# Patient Record
Sex: FEMALE | Race: WHITE
[De-identification: names, ages, dates, MRNs, and addresses within clinical notes are randomized per-mention and may not be internally consistent; named-entity substitution may affect disease eponyms.]

---

## 2017-08-19 ENCOUNTER — HOSPITAL ENCOUNTER (EMERGENCY)
Dept: HOSPITAL 75 - ER | Age: 82
Discharge: HOME | End: 2017-08-19
Payer: MEDICARE

## 2017-08-19 VITALS — DIASTOLIC BLOOD PRESSURE: 78 MMHG | SYSTOLIC BLOOD PRESSURE: 142 MMHG

## 2017-08-19 VITALS — BODY MASS INDEX: 25.1 KG/M2 | HEIGHT: 64 IN | WEIGHT: 147 LBS

## 2017-08-19 DIAGNOSIS — S01.81XA: ICD-10-CM
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
## 2017-08-19 DIAGNOSIS — I10: ICD-10-CM

## 2017-08-19 DIAGNOSIS — S52.572A: Primary | ICD-10-CM

## 2017-08-19 DIAGNOSIS — Z90.79: ICD-10-CM

## 2017-08-19 DIAGNOSIS — E78.00: ICD-10-CM

## 2017-08-19 DIAGNOSIS — K21.9: ICD-10-CM

## 2017-08-19 DIAGNOSIS — Z90.49: ICD-10-CM

## 2017-08-19 DIAGNOSIS — Z90.710: ICD-10-CM
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
## 2017-08-19 PROCEDURE — 90471 IMMUNIZATION ADMIN: CPT

## 2017-08-19 PROCEDURE — 64450 NJX AA&/STRD OTHER PN/BRANCH: CPT

## 2017-08-19 PROCEDURE — 90715 TDAP VACCINE 7 YRS/> IM: CPT

## 2017-08-19 PROCEDURE — 70450 CT HEAD/BRAIN W/O DYE: CPT

## 2017-08-19 PROCEDURE — 73090 X-RAY EXAM OF FOREARM: CPT

## 2017-08-19 PROCEDURE — 72125 CT NECK SPINE W/O DYE: CPT

## 2017-08-19 PROCEDURE — 25605 CLTX DST RDL FX/EPHYS SEP W/: CPT

## 2017-08-19 PROCEDURE — 96375 TX/PRO/DX INJ NEW DRUG ADDON: CPT

## 2017-08-19 PROCEDURE — 96374 THER/PROPH/DIAG INJ IV PUSH: CPT

## 2017-08-19 PROCEDURE — 29125 APPL SHORT ARM SPLINT STATIC: CPT

## 2017-08-19 PROCEDURE — 73110 X-RAY EXAM OF WRIST: CPT

## 2017-08-19 PROCEDURE — 12011 RPR F/E/E/N/L/M 2.5 CM/<: CPT

## 2017-08-19 NOTE — DIAGNOSTIC IMAGING REPORT
INDICATION:  Fall, deformity, fracture. History previous wrist

fracture 20 years prior.



TECHNIQUE:  3 views of the left wrist  



CORRELATION STUDY:  None



FINDINGS: There is a comminuted but predominantly transverse

fracture of the distal radius. There is significant volar

displacement of the majority of the fracture fragments. Fracture

lines do extend into the articular surface. The carpal bones

follow displaced distal radial fracture fragments. Nondisplaced

ulnar styloid process fracture is noted. Age of this is somewhat

indeterminate may be chronic. However there does appear to be

additional nondisplaced fracture of the distal ulna as well. 

Diffuse bony demineralization. Rather advanced degenerative

change of the first carpometacarpal articulation.



IMPRESSION:  

1.  Comminuted impacted displaced interarticular distal left

radius fracture. Indeterminate ulnar styloid process fracture

with an additional nondisplaced fracture of the distal ulna

shaft.     



Dictated by: 



  Dictated on workstation # [id number]

## 2017-08-19 NOTE — DIAGNOSTIC IMAGING REPORT
INDICATION: Postreduction.



Exam compared with study earlier this same date.



FINDINGS: Alignment and impaction of the distal radial

metadiaphyseal junction fracture significantly improved. Residual

anterior angulation much less pronounced. Anterior displacement

of major distal fragment no longer apparent. Avulsion at the base

of the ulnar styloid unchanged this may be chronic.



IMPRESSION: Much improved alignment with decreased impaction,

angulation and displacement of distal radial fracture.



Dictated by: 



  Dictated on workstation # [id number]

## 2017-08-19 NOTE — DIAGNOSTIC IMAGING REPORT
PROCEDURE: CT head and CT cervical spine without contrast.



TECHNIQUE: Multiple contiguous axial images were obtained through

the brain and cervical spine without the use of intravenous

contrast. Sagittal and coronal reformations through the cervical

spine were then performed.



INDICATION:  Fall with lacerations to the left head anteriorly,

pain.



Head CT compared to 10/18/2016.



There is cerebral cortical atrophy and extensive periventricular

white matter disease all unchanged from prior. No focal edema. No

sulcal effacement. There are no abnormal extra-axial fluid

collections and there is no evidence for intracranial involvement

by hemorrhage. Atrophy is unchanged without alok hydrocephalus.

Orbits, sinuses and calvarium appeared nonacute. Tiny scalp 

irregularity supraorbital left frontal region noted. No retained

opaque foreign body.



CT cervical spine: The vertebral body heights are within normal

limits. There is no acute or suspicious endplate irregularity.

There is grade 1 listhesis anteriorly C3 on C4 of 4 mm. Remaining

levels are aligned normally. Disc space narrowing, endplate

sclerosis and uncovertebral joint spurring greatest at the C4-5

and C5-C6 as well as C6-C7 levels. These findings however result

in only mild canal stenosis and multilevel mild bony foraminal

narrowing present. A cervical fracture is not identified and

there is no paravertebral hemorrhage. There is chronic vascular

calcifications of the carotids and partially calcified

multinodular enlargement of both lobes of the thyroid. 



IMPRESSION:

 CT head: Chronic senescent and atrophic changes as well as

chronic white matter disease. Small focal area of scalp 

irregularity on the left. No fracture or hemorrhage.



CT cervical spine: Degenerative changes with slight grade 1

degenerative listhesis. No severe stenosis or fracture.

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

atherosclerosis.



 



Dictated by: 



  Dictated on workstation # [id number]

## 2017-08-19 NOTE — XMS REPORT
Clinical Summary

 Created on: 2017



Charito Rowell

External Reference #: [id number]

: 1928

Sex: Female



Demographics







 Address  [address] [place]  [address]

 

 Home Phone  [phone]

 

 Preferred Language  English

 

 Marital Status  Unknown

 

 Cheondoism Affiliation  UNK

 

 Race  White

 

 Ethnic Group  Unknown





Author







 Author  [facility]

 

 Organization  [facility]

 

 Address  Unknown

 

 Phone  Unavailable







Support







 Name  Relationship  Address  Phone

 

 , UNIQUE ROWELL  ECON  Unknown  [phone]







Care Team Providers







 Care Team Member Name  Role  Phone

 

  PCP  Unavailable







Source Comments

Some departments are not documenting in the electronic medical record.  If you 
do not see the information that you expected, contact Release of Information in 
the Health Information Management department at [phone] for further 
assistance in locating additional records.[facility]



Allergies

No Known Allergies



Current Medications







      



  Prescription   Sig.   Disp.   Refills   Start   End Date   Status



      Date  

 

      



  cholecalciferol (Vitamin   Take 2,000 Units by mouth       Active



  D3) (VITAMIN D-3) 1,000   daily.     



  units tablet      

 

      



  MULTIVITAMIN PO   Take 1 Cap by mouth       Active



   daily.     

 

      



  omeprazole DR(+)   Take 20 mg by mouth       Active



  (PRILOSEC) 20 mg capsule   daily.     

 

      



  pravastatin (PRAVACHOL)   Take 1 Tab by mouth   90 Cap   3   20    
Active



  40 mg tablet   daily.     16  

 

      



  amLODIPine (NORVASC) 5 mg   Take 1 Tab by mouth   90 Tab   1   20    
Active



  tablet   daily.     16  

 

      



  lisinopril (PRINIVIL;   TAKE 1 TABLET TWICE A DAY   180 Tab   2   20    
Active



  ZESTRIL) 20 mg tablet      16  







Active Problems







 



  Problem   Noted Date

 

 



  Essential hypertension   2016

 

 



  Hyperlipidemia   2016

 

 



  Hyperglycemia   2014

 

 



  Dysphagia   10/14/2013

 

 



  Hypertension 

 

 



  Hyperlipidemia 

 

 



  Sleep disorder 

 

 



  Arthritis 

 

 



  Stomach disorder 







Immunizations







  



  Name   Dates Previously Given   Next Due

 

  



  Flu Vaccine Trivalent >64   10/07/2015 



  Yo High-dose  



  (Preservative Free)  

 

  



  Pneumococcal Vaccine   2011 



  (23-Haylee Adult)  

 

  



  Pneumococcal   2015 



  Vaccine(13-Haylee  



  Peds/immunocompromised  



  adult)  

 

  



  Tdap Vaccine   2014 







Family History







   



  Medical History   Relation   Name   Comments

 

   



  Diabetes   Father  

 

   



  Heart Disease   Father  

 

   



  Diabetes   Maternal  



   Uncle  

 

   



  Hypertension   Mother  

 

   



  Diabetes   Sister  









   



  Relation   Name   Status   Comments

 

   



  Father       heart



    (Age 58) 

 

   



  Maternal Uncle   

 

   



  Mother     



    (Age 85) 

 

   



  Other       diabetes



    (Age 77) 

 

   



  Sister   







Social History







    



  Tobacco Use   Types   Packs/Day   Years Used   Date

 

    



  Never Smoker    

 

    



  Smokeless Tobacco: Never   



  Used   









 Tobacco Cessation: Counseling Given: No











   



  Alcohol Use   Drinks/Week   oz/Week   Comments

 

   



  No   









 



  Sex Assigned at Birth   Date Recorded

 

 



  Not on file 







Last Filed Vital Signs







  



  Vital Sign   Reading   Time Taken

 

  



  Blood Pressure   130/88   2016  9:06 AM CDT

 

  



  Pulse   80   2016  9:06 AM CDT

 

  



  Temperature   36.7   C (98   F)   2015 12:55 PM CST

 

  



  Respiratory Rate   14   2016 10:11 AM CST

 

  



  Oxygen Saturation   98%   2015 12:55 PM CST

 

  



  Inhaled Oxygen   -   -



  Concentration  

 

  



  Weight   68.4 kg (150 lb 12.8 oz)   2016  9:06 AM CDT

 

  



  Height   162.6 cm (5' 4")   2016  9:06 AM CDT

 

  



  Body Mass Index   25.88   2016  9:06 AM CDT







Plan of Treatment







   



  Health Maintenance   Due Date   Last Done   Comments

 

   



  PHYSICAL (COMPREHENSIVE)   2015 



  EXAM   

 

   



  INFLUENZA VACCINE   2017   10/07/2015 

 

   



  TETANUS VACCINE   2024 

 

   



  SHINGLES VACCINE   Addressed   2009 (Previously completed)   Overridden 
with the



     intention of not



     completing the topic

 

   



  OSTEOPOROSIS SCREENING   Completed   2014 

 

   



  PERTUSSIS VACCINE   Completed   2014 

 

   



  PREVNAR/PNEUMOVAX   Completed   2015, 2011 







Results

Not on filefrom Last 3 Months

## 2017-08-19 NOTE — ED FALL/INJURY
General


Chief Complaint:  Trauma-Non Activation


Stated Complaint:  FALL


Nursing Triage Note:  


SEE NON-TRAUMA ACTIVATION


Source:  patient


Exam Limitations:  no limitations





History of Present Illness


Time seen by provider:  10:17


Initial Comments


Here with report of pain to left wrist and left side of head after falling 

while walking her dog.  She apparently tripped on a curb/sidewalk and landed on 

her left side.  She broke her fall with her left wrist but did hit her head on 

the left.  She has 3 cm laceration to the left side of the face lateral to the 

left eye and brow.  Complains of pain and deformity of the left wrist.  Has 

previously broken the left wrist several years ago.  Denies loss of 

consciousness.  Tetanus is not up-to-date.


Location Injury Occurred:  [facility] [place]IDE[place]ALK


Occurred:  just prior to arrival (approximately 30 minutes ago)


Severity:  moderate


Injuries/Pain Location:  face, upper extremity


Context:  tripped


Loss of Consciousness:  no loss of consciousness


Modifying Factors:  Improves With Immobilization, Worse With Movement, Improves 

With Pain Medication


Associated Symptoms (Fall):  No Abdominal Pain, No Chest Pain, Headache, No 

Muscle Spasms, No Nausea/Vomiting, No Neck Pain, No Shortness of Air





Allergies and Home Medications


Allergies


Coded Allergies:  


     No Known Drug Allergies (Unverified , 10/18/16)





Home Medications


Amlodipine Besylate 5 Mg Tablet, 5 MG PO DAILY, (Reported)


Cholecalciferol (Vitamin D3) 2,000 Unit Capsule, 2,000 UNIT PO DAILY, (Reported)


Lisinopril 20 Mg Tablet, 20 MG PO BID, (Reported)


Multivitamin 1 Each Tablet, 1 EACH PO DAILY, (Reported)


Omeprazole 20 Mg Capsule.dr, 20 MG PO DAILY, (Reported)


Pravastatin Sodium 40 Mg Tablet, 40 MG PO DAILY, (Reported)





Constitutional:  see HPI, No chills, No fever


Eyes:  No Symptoms Reported


Ears, Nose, Mouth, Throat:  see HPI, denies ear pain, denies nose pain, denies 

nose discharge


Respiratory:  no symptoms reported


Cardiovascular:  no symptoms reported


Gastrointestinal:  no symptoms reported, No abdominal pain, No nausea, No 

vomiting


Genitourinary:  no symptoms reported


Musculoskeletal:  see HPI, joint pain, joint swelling, muscle pain


Skin:  see HPI, change in color, lesions


Psychiatric/Neurological:  No Symptoms Reported, Denies Headache, Denies 

Numbness, Denies Paresthesia, Denies Tingling, Denies Weakness


All Other Systems Reviewed


Negative Unless Noted:  Yes





Past Medical-Social-Family Hx


Patient Social History


Alcohol Use:  Denies Use


Recreational Drug Use:  No


Smoking Status:  Never a Smoker


Recent Foreign Travel:  No


Contact w/Someone Who Travel:  No


Recent Infectious Disease Expo:  No


Recent Hopitalizations:  No





Immunizations Up To Date


Tetanus Booster (TDap):  More than 5yrs


Date of Influenza Vaccine:  Oct 4, 2016





Seasonal Allergies


Seasonal Allergies:  Yes





Surgeries


HX Surgeries:  Yes


Surgeries:  Appendectomy, Bladder Surgery, Cystectomy, Hysterectomy





Respiratory


Hx Respiratory Disorders:  No





Cardiovascular


Hx Cardiac Disorders:  Yes


Cardiac Disorders:  High Cholesterol, Hypertension





Neurological


Hx Neurological Disorders:  No





Genitourinary


Hx Genitourinary Disorders:  No





Gastrointestinal


Hx Gastrointestinal Disorders:  Yes


Gastrointestinal Disorders:  Gastroesophageal Reflux





Musculoskeletal


Hx Musculoskeletal Disorders:  No





Endocrine


Hx Endocrine Disorders:  No





HEENT


HX ENT Disorders:  Yes


Hearing Impairment:  Hard of Hearing, Bilateral Hearing Aide





Cancer


Hx Cancer:  No





Psychosocial


Hx Psychiatric Problems:  No





Integumentary


HX Skin/Integumentary Disorder:  No





Blood Transfusions


Hx Blood Disorders:  No





Reviewed Nursing Assessment


Reviewed/Agree w Nursing PMH:  Yes





Family Medical History


Significant Family History:  No Pertinent Family Hx





Physical Exam


Vital Signs





Vital Sign - Last 12Hours








 17





 10:12


 


Temp 97.8


 


Pulse 93


 


Resp 18


 


B/P (MAP) 174/86


 


Pulse Ox 99





Capillary Refill : Less Than 3 Seconds


General Appearance:  WD/WN, no apparent distress


HEENT:  PERRL/EOMI, TMs normal, pharynx normal, other (laceration left side of 

face near and lateral to the left eye and brow.  Approximately 3 cm)


Neck:  full range of motion, supple


Cardiovascular:  regular rate, rhythm, no murmur


Respiratory:  lungs clear, normal breath sounds


Gastrointestinal:  non tender, soft


Back:  normal inspection, no CVA tenderness, no vertebral tenderness


Extremities:  non-tender, normal inspection


Neurologic/Psychiatric:  alert, oriented x 3


Skin:  warm/dry, other (approximately 3 cm laceration to the area of the left 

side of the face lateral to the left eye vertically oriented.  Abrasion noted 

to the left knee superficial and approximately 3 x 5 cm)





Nickie Coma Score


Best Eye Response:  (4) Open Spontaneously


Best Verbal Response:  (5) Oriented


Best Motor Response:  (6) Obeys Commands





Laceration Repair :  


   Wound Location:  Face


Other Wound Location


Left lateral face


   Wound Length (cm):  3


   Wound's Depth, Shape:  superficial


   Wound Explored:  contaminated


   Irrigated w/ Saline (ccs):  50


   Betadine Prep?:  No


   Wound Debrided:  minimal


   Other Closure Supply:  Wound Adhesive


Progress


Cleaned with Betasept and saline.  Covered with LET for anesthesia.  Closed 

with skin glue.  Tolerated procedure well.  No complications.


Splinting and Joint Reduction :  


   Pre-Proc Neuro Vasc Exam:  normal


   Post-Proc Neuro Vasc Exam:  normal


Progress


Sugar tong splint placed and joint reduced with fracture alignment performed by 

me after placement of splint after hematoma block done.  Patient tolerated 

procedure well with no Occasions.  Distal neurovascular status intact after 

placement of splint.  Patient reports markedly reduced pain.


   Hand-Made Type:  fiberglass


   Splint Application:  Short Arm (sugar tong)





Progress/Results/Core Measures


Results/Orders


My Orders





Orders - SAVANNAH ESPAÑA MD


Ct Head/Cervical Spine Wo (17 10:13)


Wrist, Left, 3 Views Or More (17 10:13)


Let Solution (Let Solution) (17 10:16)


Fentanyl  Injection (Sublimaze Injection (17 10:30)


Dipht,Pertuss(Acell),Tet Adult (Boostrix (17 10:30)


Lidocaine 2% Injection 20 Ml (Xylocaine (17 11:24)


Ondansetron Injection (Zofran Injectio (17 11:28)


Ondansetron Injection (Zofran Injectio (17 11:45)


Forearm, Left, 2 Views (17 12:27)





Medications Given in ED





Current Medications








 Medications  Dose


 Ordered  Sig/Enma


 Route  Start Time


 Stop Time Status Last Admin


Dose Admin


 


 Ondansetron HCl  4 mg  ONCE  ONCE


 IVP  17 11:45


 17 11:46 DC 17 11:30


4 MG


 


 Tetracaine/


 Epinephrine/


 Lidocaine  1 ea  STK-MED ONCE


 .ROUTE  17 10:16


 17 10:23 DC 17 10:25


1 EA








Vital Signs/I&O





Vital Sign - Last 12Hours








 17





 10:12


 


Temp 97.8


 


Pulse 93


 


Resp 18


 


B/P (MAP) 174/86


 


Pulse Ox 99














Blood Pressure Mean:  115








Progress Note :  


Progress Note


Seen and evaluated.  CT head and neck ordered.  X-ray left wrist ordered.  

Tetanus ordered.  Fentanyl 25 g IV.  Monitor patient.  1116: Fentanyl did 

improve the pain.  I did discuss the case with Dr. Almaraz.  We will do a 

hematoma block and sugar tong splint.  He will see her in the office on Monday.

  We will do simple reduction with splint placement.  Patient is complaining of 

nausea so Zofran 4 mg IV given.  1134: Hematoma block with lidocaine 2 percent 

4 mL done and patient had marked improvement of her pain.  Wound to be closed 

by HAYLEY Easley via Dermabond.  1310: Reduction and splinting performed 

by me with assistance of HAYLEY Easley.  Post reduction films noted and 

improved alignment noted.  Discharged home with return precautions.  Patient 

family verbalized understanding instructions and agreement with plan.  Patient 

will see Dr. Almaraz on Monday.





Diagnostic Imaging





   Diagonstic Imaging:  Xray


   Plain Films/CT/US/NM/MRI:  other


Comments


 [facility].


 [place], [place]





NAME:   RADHA HODGSON


[facility] REC#:   [id number]


ACCOUNT#:   [id number]


PT STATUS:   REG ER


:   1928


PHYSICIAN:   SAVANNAH ESPAÑA MD


ADMIT DATE:   17/ER


 ***Draft***


Date of Exam:17





WRIST, LEFT, 3 VIEWS OR MORE








INDICATION:  Fall, deformity, fracture. History previous wrist


fracture 20 years prior.





TECHNIQUE:  3 views of the left wrist  





CORRELATION STUDY:  None





FINDINGS: There is a comminuted but predominantly transverse


fracture of the distal radius. There is significant volar


displacement of the majority of the fracture fragments. Fracture


lines do extend into the articular surface. The carpal bones


follow displaced distal radial fracture fragments. Nondisplaced


ulnar styloid process fracture is noted. Age of this is somewhat


indeterminate may be chronic. However there does appear to be


additional nondisplaced fracture of the distal ulna as well. 


Diffuse bony demineralization. Rather advanced degenerative


change of the first carpometacarpal articulation.





IMPRESSION:  


1.  Comminuted impacted displaced interarticular distal left


radius fracture. Indeterminate ulnar styloid process fracture


with an additional nondisplaced fracture of the distal ulna


shaft.     





  Dictated on workstation # [id number]








Dict:   17 1106


Trans:   17 1116


TODD [id number]





Interpreted by:     GISEL ACKERMAN DO


Electronically signed by:








   Diagonstic Imaging:  CT


   Plain Films/CT/US/NM/MRI:  c-spine, head








   Diagonstic Imaging:  Xray


   Plain Films/CT/US/NM/MRI:  forearm


Comments


 [facility].


 [place], [place]





NAME:   RADHA HODGSON


[facility] REC#:   [id number]


ACCOUNT#:   [id number]


PT STATUS:   REG ER


:   1928


PHYSICIAN:   SAVANNAH ESPAÑA MD


ADMIT DATE:   17/ER


 ***Draft***


Date of Exam:17





FOREARM, LEFT, 2 VIEWS








INDICATION: Postreduction.





Exam compared with study earlier this same date.





FINDINGS: Alignment and impaction of the distal radial


metadiaphyseal junction fracture significantly improved. Residual


anterior angulation much less pronounced. Anterior displacement


of major distal fragment no longer apparent. Avulsion at the base


of the ulnar styloid unchanged this may be chronic.





IMPRESSION: Much improved alignment with decreased impaction,


angulation and displacement of distal radial fracture.





  Dictated on workstation # [id number]








Dict:   17 1243


Trans:   17 1248


 [id number]





Interpreted by:     QUINTIN SWAIN


Electronically signed by:


   Reviewed:  Reviewed by Me





Departure


Communication


Time/Spoke to Consulting Physi:  11:16





Impression


Impression:  


 Primary Impression:  


 Distal radius fracture, left


 Qualified Codes:  S52.572A - Other intraarticular fracture of lower end of 

left radius, initial encounter for closed fracture


 Additional Impression:  


 Laceration of face


 Qualified Codes:  S01.81XA - Laceration without foreign body of other part of 

head, initial encounter


Disposition:  01 HOME, SELF-CARE


Condition:  Improved





Departure-Patient Inst.


Decision time for Depature:  13:18


Referrals:  


TRICIA HWANG MD (PCP/Family)


Primary Care Physician








ROSIO ALMARAZ MD


Patient Instructions:  Forearm Fracture (DC), Laceration Repair With Glue (DC)





Add. Discharge Instructions:  


All discharge instructions reviewed with patient and/or family. Voiced 

understanding.  





Take medications as directed.  Follow-up with Dr. Almaraz on Monday.  Return for 

worse pain, fever, vomiting, weakness, numbness or tingling in her fingers or 

other concerns as needed.


Scripts


Hydrocodone/Acetaminophen (Hydrocodon -Acetaminophen 5-325) 1 Each Tablet


1 EACH PO Q6H Y for PAIN-MODERATE, #12 TAB 0 Refills


   Prov: SAVANNAH ESPAÑA MD         17





Copy


Copies To 1:   ROSIO ALMARAZ MD, TIMOTHY D MD Aug 19, 2017 10:50

## 2018-02-02 ENCOUNTER — HOSPITAL ENCOUNTER (OUTPATIENT)
Dept: HOSPITAL 75 - ER | Age: 83
Setting detail: OBSERVATION
LOS: 2 days | Discharge: HOME | End: 2018-02-04
Attending: INTERNAL MEDICINE | Admitting: INTERNAL MEDICINE
Payer: MEDICARE

## 2018-02-02 VITALS — BODY MASS INDEX: 25.65 KG/M2 | WEIGHT: 150.25 LBS | HEIGHT: 64 IN

## 2018-02-02 VITALS — DIASTOLIC BLOOD PRESSURE: 85 MMHG | SYSTOLIC BLOOD PRESSURE: 140 MMHG

## 2018-02-02 VITALS — SYSTOLIC BLOOD PRESSURE: 159 MMHG | DIASTOLIC BLOOD PRESSURE: 74 MMHG

## 2018-02-02 VITALS — DIASTOLIC BLOOD PRESSURE: 82 MMHG | SYSTOLIC BLOOD PRESSURE: 163 MMHG

## 2018-02-02 VITALS — SYSTOLIC BLOOD PRESSURE: 171 MMHG | DIASTOLIC BLOOD PRESSURE: 87 MMHG

## 2018-02-02 VITALS — SYSTOLIC BLOOD PRESSURE: 138 MMHG | DIASTOLIC BLOOD PRESSURE: 67 MMHG

## 2018-02-02 DIAGNOSIS — R42: ICD-10-CM

## 2018-02-02 DIAGNOSIS — J11.1: Primary | ICD-10-CM

## 2018-02-02 DIAGNOSIS — R53.1: ICD-10-CM

## 2018-02-02 DIAGNOSIS — Z79.899: ICD-10-CM

## 2018-02-02 DIAGNOSIS — E87.1: ICD-10-CM

## 2018-02-02 DIAGNOSIS — R06.2: ICD-10-CM

## 2018-02-02 DIAGNOSIS — K21.9: ICD-10-CM

## 2018-02-02 DIAGNOSIS — E78.00: ICD-10-CM

## 2018-02-02 DIAGNOSIS — K59.00: ICD-10-CM

## 2018-02-02 DIAGNOSIS — I10: ICD-10-CM

## 2018-02-02 LAB
ALBUMIN SERPL-MCNC: 4.1 GM/DL (ref 3.2–4.5)
ALP SERPL-CCNC: 57 U/L (ref 40–136)
ALT SERPL-CCNC: 24 U/L (ref 0–55)
BASOPHILS # BLD AUTO: 0.1 10^3/UL (ref 0–0.1)
BASOPHILS NFR BLD AUTO: 1 % (ref 0–10)
BASOPHILS NFR BLD MANUAL: 0 %
BILIRUB SERPL-MCNC: 0.5 MG/DL (ref 0.1–1)
BUN/CREAT SERPL: 19
CALCIUM SERPL-MCNC: 9.1 MG/DL (ref 8.5–10.1)
CHLORIDE SERPL-SCNC: 98 MMOL/L (ref 98–107)
CO2 SERPL-SCNC: 21 MMOL/L (ref 21–32)
CREAT SERPL-MCNC: 0.84 MG/DL (ref 0.6–1.3)
EOSINOPHIL # BLD AUTO: 0 10^3/UL (ref 0–0.3)
EOSINOPHIL NFR BLD AUTO: 0 % (ref 0–10)
EOSINOPHIL NFR BLD MANUAL: 0 %
ERYTHROCYTE [DISTWIDTH] IN BLOOD BY AUTOMATED COUNT: 15.8 % (ref 10–14.5)
GFR SERPLBLD BASED ON 1.73 SQ M-ARVRAT: > 60 ML/MIN
GLUCOSE SERPL-MCNC: 124 MG/DL (ref 70–105)
HCT VFR BLD CALC: 37 % (ref 35–52)
HGB BLD-MCNC: 12.9 G/DL (ref 11.5–16)
LYMPHOCYTES # BLD AUTO: 0.5 X 10^3 (ref 1–4)
LYMPHOCYTES NFR BLD AUTO: 6 % (ref 12–44)
MANUAL DIFFERENTIAL PERFORMED BLD QL: YES
MCH RBC QN AUTO: 29 PG (ref 25–34)
MCHC RBC AUTO-ENTMCNC: 35 G/DL (ref 32–36)
MCV RBC AUTO: 84 FL (ref 80–99)
MONOCYTES # BLD AUTO: 1.4 X 10^3 (ref 0–1)
MONOCYTES NFR BLD AUTO: 15 % (ref 0–12)
MONOCYTES NFR BLD: 8 %
NEUTROPHILS # BLD AUTO: 7 X 10^3 (ref 1.8–7.8)
NEUTROPHILS NFR BLD AUTO: 78 % (ref 42–75)
NEUTS BAND NFR BLD MANUAL: 85 %
NEUTS BAND NFR BLD: 0 %
PLATELET # BLD: 245 10^3/UL (ref 130–400)
PMV BLD AUTO: 11.5 FL (ref 7.4–10.4)
POTASSIUM SERPL-SCNC: 3.9 MMOL/L (ref 3.6–5)
PROT SERPL-MCNC: 7.1 GM/DL (ref 6.4–8.2)
RBC # BLD AUTO: 4.39 10^6/UL (ref 4.35–5.85)
SODIUM SERPL-SCNC: 132 MMOL/L (ref 135–145)
VARIANT LYMPHS NFR BLD MANUAL: 7 %
WBC # BLD AUTO: 8.9 10^3/UL (ref 4.3–11)

## 2018-02-02 PROCEDURE — 87804 INFLUENZA ASSAY W/OPTIC: CPT

## 2018-02-02 PROCEDURE — 87040 BLOOD CULTURE FOR BACTERIA: CPT

## 2018-02-02 PROCEDURE — 94760 N-INVAS EAR/PLS OXIMETRY 1: CPT

## 2018-02-02 PROCEDURE — 80053 COMPREHEN METABOLIC PANEL: CPT

## 2018-02-02 PROCEDURE — 85007 BL SMEAR W/DIFF WBC COUNT: CPT

## 2018-02-02 PROCEDURE — 85025 COMPLETE CBC W/AUTO DIFF WBC: CPT

## 2018-02-02 PROCEDURE — 96360 HYDRATION IV INFUSION INIT: CPT

## 2018-02-02 PROCEDURE — 36415 COLL VENOUS BLD VENIPUNCTURE: CPT

## 2018-02-02 PROCEDURE — 85027 COMPLETE CBC AUTOMATED: CPT

## 2018-02-02 PROCEDURE — 71045 X-RAY EXAM CHEST 1 VIEW: CPT

## 2018-02-02 PROCEDURE — 94640 AIRWAY INHALATION TREATMENT: CPT

## 2018-02-02 PROCEDURE — 71046 X-RAY EXAM CHEST 2 VIEWS: CPT

## 2018-02-02 RX ADMIN — SODIUM CHLORIDE AND POTASSIUM CHLORIDE SCH MLS/HR: 9; 1.49 INJECTION, SOLUTION INTRAVENOUS at 11:52

## 2018-02-02 RX ADMIN — OSELTAMIVIR PHOSPHATE SCH EACH: 30 CAPSULE ORAL at 11:52

## 2018-02-02 RX ADMIN — GUAIFENESIN AND CODEINE PHOSPHATE PRN ML: 100; 10 SOLUTION ORAL at 20:05

## 2018-02-02 RX ADMIN — ALBUTEROL SULFATE SCH MG: 2.5 SOLUTION RESPIRATORY (INHALATION) at 14:45

## 2018-02-02 RX ADMIN — SODIUM CHLORIDE AND POTASSIUM CHLORIDE SCH MLS/HR: 9; 1.49 INJECTION, SOLUTION INTRAVENOUS at 20:05

## 2018-02-02 RX ADMIN — ALBUTEROL SULFATE SCH MG: 2.5 SOLUTION RESPIRATORY (INHALATION) at 16:21

## 2018-02-02 RX ADMIN — OSELTAMIVIR PHOSPHATE SCH EACH: 30 CAPSULE ORAL at 20:05

## 2018-02-02 NOTE — XMS REPORT
Clinical Summary

 Created on: 2018



Charito Rowell

External Reference #: [id number]

: 1928

Sex: Female



Demographics







 Address  [address] [place]  [address]

 

 Home Phone  [phone]

 

 Preferred Language  English

 

 Marital Status  Unknown

 

 Judaism Affiliation  UNK

 

 Race  White

 

 Ethnic Group  Unknown





Author







 Author  [facility]

 

 Organization  [facility]

 

 Address  Unknown

 

 Phone  Unavailable







Support







 Name  Relationship  Address  Phone

 

 UNIQUE ROWELL  RUBEN  Unknown  [phone]







Care Team Providers







 Care Team Member Name  Role  Phone

 

 George Vann MD  Unavailable  [phone]

 

 Toy Good MD  Unavailable  [phone]

 

 Chetna Armstrong MD  PCP  [phone]

 

 Ariadna Nichole  Unavailable  [phone]

 

 Sameera Rodriguez LPN  Unavailable  Unavailable







Source Comments

Some departments are not documenting in the electronic medical record.  If you 
do not see the information that you expected, contact Release of Information in 
the Health Information Management department at [phone] for further 
assistance in locating additional records.[facility]



Allergies

No Known Allergies



Current Medications







      



  Prescription   Sig.   Disp.   Refills   Start   End Date   Status



      Date  

 

      



  cholecalciferol (Vitamin   Take 2,000 Units by mouth       Active



  D3) (VITAMIN D-3) 1,000   daily.     



  units tablet      

 

      



  MULTIVITAMIN PO   Take 1 Cap by mouth       Active



   daily.     

 

      



  omeprazole DR(+)   Take 20 mg by mouth       Active



  (PRILOSEC) 20 mg capsule   daily.     

 

      



  pravastatin (PRAVACHOL)   Take 1 Tab by mouth   90 Cap   3   20    
Active



  40 mg tablet   daily.     16  

 

      



  amLODIPine (NORVASC) 5 mg   Take 1 Tab by mouth   90 Tab   1   20    
Active



  tablet   daily.     16  

 

      



  lisinopril (PRINIVIL;   TAKE 1 TABLET TWICE A DAY   180 Tab   2   20    
Active



  ZESTRIL) 20 mg tablet      16  







Active Problems







 



  Problem   Noted Date

 

 



  Essential hypertension   2016

 

 



  Hyperlipidemia   2016

 

 



  Hyperglycemia   2014

 

 



  Dysphagia   10/14/2013

 

 



  Hypertension 

 

 



  Hyperlipidemia 

 

 



  Sleep disorder 

 

 



  Arthritis 

 

 



  Stomach disorder 







Immunizations







  



  Name   Dates Previously Given   Next Due

 

  



  Flu Vaccine Trivalent >64   10/07/2015 



  Yo High-dose  



  (Preservative Free)  

 

  



  Pneumococcal Vaccine   2011 



  (23-Haylee Adult)  

 

  



  Pneumococcal   2015 



  Vaccine(13-Haylee  



  Peds/immunocompromised  



  adult)  

 

  



  Tdap Vaccine   2014 







Family History







   



  Medical History   Relation   Name   Comments

 

   



  Diabetes   Father  

 

   



  Heart Disease   Father  

 

   



  Diabetes   Maternal  



   Uncle  

 

   



  Hypertension   Mother  

 

   



  Diabetes   Sister  









   



  Relation   Name   Status   Comments

 

   



  Father       heart



    (Age 58) 

 

   



  Maternal Uncle   

 

   



  Mother     



    (Age 85) 

 

   



  Other       diabetes



    (Age 77) 

 

   



  Sister   







Social History







    



  Tobacco Use   Types   Packs/Day   Years Used   Date

 

    



  Never Smoker    

 

    



  Smokeless Tobacco: Never   



  Used   









 Tobacco Cessation: Counseling Given: No











   



  Alcohol Use   Drinks/Week   oz/Week   Comments

 

   



  No   









 



  Sex Assigned at Birth   Date Recorded

 

 



  Not on file 







Last Filed Vital Signs







  



  Vital Sign   Reading   Time Taken

 

  



  Blood Pressure   130/88   2016  9:06 AM CDT

 

  



  Pulse   80   2016  9:06 AM CDT

 

  



  Temperature   36.7   C (98   F)   2015 12:55 PM CST

 

  



  Respiratory Rate   14   2016 10:11 AM CST

 

  



  Oxygen Saturation   98%   2015 12:55 PM CST

 

  



  Inhaled Oxygen   -   -



  Concentration  

 

  



  Weight   68.4 kg (150 lb 12.8 oz)   2016  9:06 AM CDT

 

  



  Height   162.6 cm (5' 4")   2016  9:06 AM CDT

 

  



  Body Mass Index   25.88   2016  9:06 AM CDT







Plan of Treatment







   



  Health Maintenance   Due Date   Last Done   Comments

 

   



  PHYSICAL (COMPREHENSIVE)   2015 



  EXAM   

 

   



  INFLUENZA VACCINE   2017   10/07/2015 

 

   



  TETANUS VACCINE   2024 

 

   



  SHINGLES VACCINE   Addressed   2009 (Previously completed)   Overridden 
with the



     intention of not



     completing the topic

 

   



  OSTEOPOROSIS SCREENING   Completed   2014 

 

   



  PERTUSSIS VACCINE   Completed   2014 

 

   



  PREVNAR/PNEUMOVAX   Completed   2015, 2011 







Results

Not on filefrom Last 3 Months

## 2018-02-02 NOTE — DIAGNOSTIC IMAGING REPORT
INDICATION: Dizziness.



Time of exam: 7:48 AM



Correlation is made with prior study from 10/18/2016.



The heart size is stable. Lungs are clear. No infiltrate or

failure is detected. There is no effusion or pneumothorax.



IMPRESSION: No acute cardiopulmonary process is detected.



Dictated by: 



  Dictated on workstation # [id number]

## 2018-02-02 NOTE — ED GENERAL
General


Stated Complaint:  WEAKNESS,COUGH


Source of Information:  Patient, EMS


Exam Limitations:  No Limitations





History of Present Illness


Date Seen by Provider:  Feb 2, 2018


Time Seen by Provider:  07:25


Initial Comments


The patient is an 89-year-old white female brought to the emergency room by 

ambulance with complaints of cough and generalized weakness.  Cosleep


Associated Systoms:  Cough, Fever/Chills, Weakness





Allergies and Home Medications


Allergies


Coded Allergies:  


     No Known Drug Allergies (Unverified , 10/18/16)





Home Medications


Amlodipine Besylate 5 Mg Tablet, 5 MG PO DAILY, (Reported)


Cholecalciferol (Vitamin D3) 2,000 Unit Capsule, 2,000 UNIT PO DAILY, (Reported)


Hydrocodone Bit/Acetaminophen 1 Each Tablet, 1 EACH PO Q6H PRN for PAIN-MODERATE

, #12 Ref 0


   Prescribed by: SAVANNAH ESPAÑA on 8/19/17 1319


Lisinopril 20 Mg Tablet, 20 MG PO BID, (Reported)


Lisinopril 40 Mg Tablet, (Reported)


Multivitamin 1 Each Tablet, 1 EACH PO DAILY, (Reported)


Omeprazole 20 Mg Capsule.dr, 20 MG PO DAILY, (Reported)


Pravastatin Sodium 40 Mg Tablet, 40 MG PO DAILY, (Reported)





Constitutional:  see HPI


EENTM:  nose congestion


Respiratory:  cough


Cardiovascular:  no symptoms reported


Gastrointestinal:  no symptoms reported


Genitourinary:  no symptoms reported


Musculoskeletal:  muscle pain


Skin:  no symptoms reported


Psychiatric/Neurological:  No Symptoms Reported


Hematologic/Lymphatic:  No Symptoms Reported


Immunological/Allergic:  no symptoms reported





Past Medical-Social-Family Hx


Patient Social History


Recent Hopitalizations:  No





Immunizations Up To Date


Tetanus Booster (TDap):  More than 5yrs


Date of Influenza Vaccine:  Oct 4, 2016





Seasonal Allergies


Seasonal Allergies:  Yes





Surgeries


History of Surgeries:  Yes


Surgeries:  Appendectomy, Bladder Surgery, Cystectomy, Hysterectomy





Respiratory


History of Respiratory Disorde:  No





Cardiovascular


History of Cardiac Disorders:  Yes


Cardiac Disorders:  High Cholesterol, Hypertension





Neurological


History of Neurological Disord:  No





Gastrointestinal


History of Gastrointestinal Di:  Yes


Gastrointestinal Disorders:  Gastroesophageal Reflux





Musculoskeletal


History of Musculoskeletal Dis:  No





Endocrine


History of Endocrine Disorders:  No





HEENT


Hearing Impairment:  Hard of Hearing, Bilateral Hearing Aide





Cancer


History of Cancer:  No





Psychosocial


History of Psychiatric Problem:  No





Integumentary


History of Skin or Integumenta:  No





Blood Transfusions


History of Blood Disorders:  No





Family Medical History


Significant Family History:  No Pertinent Family Hx





Physical Exam


Vital Signs





Vital Sign - Last 12Hours








 2/2/18





 07:16


 


Temp 99.6


 


Pulse 101


 


Resp 18


 


B/P (MAP) 163/82 (109)


 


Pulse Ox 98


 


O2 Delivery Room Air





Capillary Refill :


General Appearance:  Mild Distress


Eyes:  Bilateral Eye Normal Inspection


HEENT:  Normal ENT Inspection


Neck:  Full Range of Motion, Normal Inspection, Non Tender, Supple, Carotid 

Bruit


Respiratory:  Other (harsh rattling cough)


Cardiovascular:  Regular Rate, Rhythm, No Edema, No Gallop, No JVD, No Murmur, 

Normal Peripheral Pulses


Gastrointestinal:  Normal Bowel Sounds, No Organomegaly, No Pulsatile Mass, Non 

Tender, Soft


Back:  Normal Inspection, No CVA Tenderness, No Vertebral Tenderness


Extremity:  Normal Capillary Refill, Normal Inspection, Normal Range of Motion, 

Non Tender, No Calf Tenderness, No Pedal Edema


Skin:  Normal Color, Warm/Dry


Lymphatic:  No Adenopathy





Progress/Results/Core Measures


Suspected Sepsis


SIRS


Temperature: 


Pulse:  


Respiratory Rate: 


 


Laboratory Tests


2/2/18 07:31: White Blood Count 8.9


Blood Pressure  / 


Mean: 


 





Laboratory Tests


2/2/18 07:31: 


Creatinine 0.84, Platelet Count 245, Total Bilirubin 0.5








Results/Orders


Lab Results





Laboratory Tests








Test


  2/2/18


07:31 Range/Units


 


 


White Blood Count


  8.9 


  4.3-11.0


10^3/uL


 


Red Blood Count


  4.39 


  4.35-5.85


10^6/uL


 


Hemoglobin 12.9  11.5-16.0  G/DL


 


Hematocrit 37  35-52  %


 


Mean Corpuscular Volume 84  80-99  FL


 


Mean Corpuscular Hemoglobin 29  25-34  PG


 


Mean Corpuscular Hemoglobin


Concent 35 


  32-36  G/DL


 


 


Red Cell Distribution Width 15.8 H 10.0-14.5  %


 


Platelet Count


  245 


  130-400


10^3/uL


 


Mean Platelet Volume 11.5 H 7.4-10.4  FL


 


Neutrophils (%) (Auto) 78 H 42-75  %


 


Lymphocytes (%) (Auto) 6 L 12-44  %


 


Monocytes (%) (Auto) 15 H 0-12  %


 


Eosinophils (%) (Auto) 0  0-10  %


 


Basophils (%) (Auto) 1  0-10  %


 


Neutrophils # (Auto) 7.0  1.8-7.8  X 10^3


 


Lymphocytes # (Auto) 0.5 L 1.0-4.0  X 10^3


 


Monocytes # (Auto) 1.4 H 0.0-1.0  X 10^3


 


Eosinophils # (Auto)


  0.0 


  0.0-0.3


10^3/uL


 


Basophils # (Auto)


  0.1 


  0.0-0.1


10^3/uL


 


Neutrophils % (Manual) 85   %


 


Lymphocytes % (Manual) 7   %


 


Monocytes % (Manual) 8   %


 


Eosinophils % (Manual) 0   %


 


Basophils % (Manual) 0   %


 


Band Neutrophils 0   %


 


Sodium Level 132 L 135-145  MMOL/L


 


Potassium Level 3.9  3.6-5.0  MMOL/L


 


Chloride Level 98    MMOL/L


 


Carbon Dioxide Level 21  21-32  MMOL/L


 


Anion Gap 13  5-14  MMOL/L


 


Blood Urea Nitrogen 16  7-18  MG/DL


 


Creatinine


  0.84 


  0.60-1.30


MG/DL


 


Estimat Glomerular Filtration


Rate > 60 


   


 


 


BUN/Creatinine Ratio 19   


 


Glucose Level 124 H   MG/DL


 


Calcium Level 9.1  8.5-10.1  MG/DL


 


Total Bilirubin 0.5  0.1-1.0  MG/DL


 


Aspartate Amino Transf


(AST/SGOT) 41 H


  5-34  U/L


 


 


Alanine Aminotransferase


(ALT/SGPT) 24 


  0-55  U/L


 


 


Alkaline Phosphatase 57    U/L


 


Total Protein 7.1  6.4-8.2  GM/DL


 


Albumin 4.1  3.2-4.5  GM/DL








Micro Results





Microbiology


2/2/18 Influenza Types A,B Antigen ([facility]) - Final, Complete


         





My Orders





Orders - KIKO TRORE MD


Cbc With Automated Diff (2/2/18 07:26)


Comprehensive Metabolic Panel (2/2/18 07:26)


Ua Culture If Indicated (2/2/18 07:26)


Chest 1 View, Ap/Pa Only (2/2/18 07:26)


Manual Differential (2/2/18 07:31)


Blood Culture (2/2/18 08:44)


Influenza A And B Antigens (2/2/18 08:44)


Sputum Culture (2/2/18 08:44)


Lactic Acid Analyzer (2/2/18 08:44)


Ns Iv 1000 Ml (Sodium Chloride 0.9%) (2/2/18 08:45)





Vital Signs/I&O





Vital Sign - Last 12Hours








 2/2/18





 07:16


 


Temp 99.6


 


Pulse 101


 


Resp 18


 


B/P (MAP) 163/82 (109)


 


Pulse Ox 98


 


O2 Delivery Room Air





Capillary Refill :





Departure


Communication (Admissions)


Progress Notes


Influenza B-positive


Discussed with Dr. Johnson, hospitalist, at 09 10





Impression


Impression:  


 Primary Impression:  


 Influenza-like symptoms


Disposition:  09 ADMITTED AS INPATIENT


Condition:  Stable/Unchanged





Admissions


Decision to Admit Reason:  Admit from ER (General)


Decision to Admit/Date:  Feb 2, 2018


Time/Decision to Admit Time:  08:58





Departure-Patient Inst.


Referrals:  


TRICIA HWANG MD (PCP/Family)


Primary Care Physician











KIKO TORRE MD Feb 2, 2018 07:26

## 2018-02-02 NOTE — XMS REPORT
Clinical Summary

 Created on: 2018



Charito Rowell

External Reference #: [id number]

: 1928

Sex: Female



Demographics







 Address  [address]  [address]

 

 Home Phone  [phone]

 

 Preferred Language  English

 

 Marital Status  Unknown

 

 Voodoo Affiliation  UNK

 

 Race  White

 

 Ethnic Group  Unknown





Author







 Author  [facility]

 

 Organization  [facility]

 

 Address  Unknown

 

 Phone  Unavailable







Support







 Name  Relationship  Address  Phone

 

 UNIQUE ROWELL  RUBEN  Unknown  [phone]







Care Team Providers







 Care Team Member Name  Role  Phone

 

 George Vann MD  Unavailable  [phone]

 

 Toy Good MD  Unavailable  [phone]

 

 Chetna Armstrong MD  PCP  [phone]

 

 Ariadna Nichole  Unavailable  [phone]

 

 Sameera Rodriguez LPN  Unavailable  Unavailable







Source Comments

Some departments are not documenting in the electronic medical record.  If you 
do not see the information that you expected, contact Release of Information in 
the Health Information Management department at [phone] for further 
assistance in locating additional records.[facility]



Allergies

No Known Allergies



Current Medications







      



  Prescription   Sig.   Disp.   Refills   Start   End Date   Status



      Date  

 

      



  cholecalciferol (Vitamin   Take 2,000 Units by mouth       Active



  D3) (VITAMIN D-3) 1,000   daily.     



  units tablet      

 

      



  MULTIVITAMIN PO   Take 1 Cap by mouth       Active



   daily.     

 

      



  omeprazole DR(+)   Take 20 mg by mouth       Active



  (PRILOSEC) 20 mg capsule   daily.     

 

      



  pravastatin (PRAVACHOL)   Take 1 Tab by mouth   90 Cap   3   20    
Active



  40 mg tablet   daily.     16  

 

      



  amLODIPine (NORVASC) 5 mg   Take 1 Tab by mouth   90 Tab   1   20    
Active



  tablet   daily.     16  

 

      



  lisinopril (PRINIVIL;   TAKE 1 TABLET TWICE A DAY   180 Tab   2   20    
Active



  ZESTRIL) 20 mg tablet      16  







Active Problems







 



  Problem   Noted Date

 

 



  Essential hypertension   2016

 

 



  Hyperlipidemia   2016

 

 



  Hyperglycemia   2014

 

 



  Dysphagia   10/14/2013

 

 



  Hypertension 

 

 



  Hyperlipidemia 

 

 



  Sleep disorder 

 

 



  Arthritis 

 

 



  Stomach disorder 







Immunizations







  



  Name   Dates Previously Given   Next Due

 

  



  Flu Vaccine Trivalent >64   10/07/2015 



  Yo High-dose  



  (Preservative Free)  

 

  



  Pneumococcal Vaccine   2011 



  (23-Haylee Adult)  

 

  



  Pneumococcal   2015 



  Vaccine(13-Haylee  



  Peds/immunocompromised  



  adult)  

 

  



  Tdap Vaccine   2014 







Family History







   



  Medical History   Relation   Name   Comments

 

   



  Diabetes   Father  

 

   



  Heart Disease   Father  

 

   



  Diabetes   Maternal  



   Uncle  

 

   



  Hypertension   Mother  

 

   



  Diabetes   Sister  









   



  Relation   Name   Status   Comments

 

   



  Father       heart



    (Age 58) 

 

   



  Maternal Uncle   

 

   



  Mother     



    (Age 85) 

 

   



  Other       diabetes



    (Age 77) 

 

   



  Sister   







Social History







    



  Tobacco Use   Types   Packs/Day   Years Used   Date

 

    



  Never Smoker    

 

    



  Smokeless Tobacco: Never   



  Used   









 Tobacco Cessation: Counseling Given: No











   



  Alcohol Use   Drinks/Week   oz/Week   Comments

 

   



  No   









 



  Sex Assigned at Birth   Date Recorded

 

 



  Not on file 







Last Filed Vital Signs







  



  Vital Sign   Reading   Time Taken

 

  



  Blood Pressure   130/88   2016  9:06 AM CDT

 

  



  Pulse   80   2016  9:06 AM CDT

 

  



  Temperature   36.7   C (98   F)   2015 12:55 PM CST

 

  



  Respiratory Rate   14   2016 10:11 AM CST

 

  



  Oxygen Saturation   98%   2015 12:55 PM CST

 

  



  Inhaled Oxygen   -   -



  Concentration  

 

  



  Weight   68.4 kg (150 lb 12.8 oz)   2016  9:06 AM CDT

 

  



  Height   162.6 cm (5' 4")   2016  9:06 AM CDT

 

  



  Body Mass Index   25.88   2016  9:06 AM CDT







Plan of Treatment







   



  Health Maintenance   Due Date   Last Done   Comments

 

   



  PHYSICAL (COMPREHENSIVE)   2015 



  EXAM   

 

   



  INFLUENZA VACCINE   2017   10/07/2015 

 

   



  TETANUS VACCINE   2024 

 

   



  SHINGLES VACCINE   Addressed   2009 (Previously completed)   Overridden 
with the



     intention of not



     completing the topic

 

   



  OSTEOPOROSIS SCREENING   Completed   2014 

 

   



  PERTUSSIS VACCINE   Completed   2014 

 

   



  PREVNAR/PNEUMOVAX   Completed   2015, 2011 







Results

Not on filefrom Last 3 Months

## 2018-02-02 NOTE — HISTORY & PHYSICAL-HOSPITALIST
HPI


History of Present Illness:


HPI/Chief Complaint


CC: Influenza B with inability to ambulate





HPI: This is an 89yo[facility] clinic patient of Dr Gamez's who presented to the ER w/c

/o fever and cough and so weak she could no longer walk. The illness began 

yesterday morning and had worsened to the point that her daughter brought her 

to the ER.  She was found to have influenza B positive the chest x-ray was 

normal but did have wheezing and hyponatremia so she was in need of Tamiflu 

administration along with IV fluid supportive care breathing treatments and 

monitored closely.


Source:  patient, family


Exam Limitations:  no limitations


Date Seen


2/2/18


Time Seen by Provider:  09:15


Attending Physician


Tracey Johnson John D MD


Referring Physician





Date of Admission


Feb 2, 2018 at 09:01





Home Medications & Allergies


Home Medications


Reviewed patient Home Medication Reconciliation Form





Allergies





Allergies


Coded Allergies


  No Known Drug Allergies (Unverified2/2/18)








Past Medical-Social-Family Hx


Patient Social History


Marrital Status:  


Employed/Student:  retired


Alcohol Use:  Occasionally Uses


Recreational Drug Use:  No


Smoking Status:  Never a Smoker


2nd Hand Smoke Exposure:  No


Recent Foreign Travel:  No


Contact w/other who traveled:  No


Recent Hopitalizations:  No


Recent Infectious Disease Expo:  No





Immunizations Up To Date


Tetanus Booster (TDap):  More than 5yrs


Date of Influenza Vaccine:  Oct 4, 2016





Seasonal Allergies


Seasonal Allergies:  Yes





Surgeries


Yes


Appendectomy, Bladder Surgery, Cystectomy, Hysterectomy





Respiratory


No





Cardiovascular


Yes


High Cholesterol, Hypertension





Neurological


No





Gastrointestinal


Yes


Gastroesophageal Reflux





Musculoskeletal


No





Endocrine


History of Endocrine Disorders:  No





HEENT


Hearing Impairment:  Hard of Hearing, Bilateral Hearing Aide





Cancer


No





Psychosocial


History of Psychiatric Problem:  No





Integumentary


History of Skin or Integumenta:  No





Blood Transfusions


History of Blood Disorders:  No





Family Medical History


Significant Family History:  No Pertinent Family Hx





Review of Systems


Constitutional:  see HPI, diaphoresis, dizziness, fever, malaise, weakness


EENTM:  no symptoms reported


Respiratory:  cough, wheezing


Cardiovascular:  no symptoms reported


Gastrointestinal:  loss of appetite, nausea


Genitourinary:  decreased output


Musculoskeletal:  back pain


Skin:  no symptoms reported


Psychiatric/Neurological:  No Symptoms Reported


All Other Systems Reviewed


Negative Unless Noted:  Yes





Physical Exam


Physical Exam


Vital Signs





Vital Sign - Last 12Hours








 2/2/18





 07:16


 


Temp 99.6


 


Pulse 101


 


Resp 18


 


B/P (MAP) 163/82 (109)


 


Pulse Ox 98


 


O2 Delivery Room Air





Capillary Refill : Less Than 3 Seconds


General Appearance:  WD/WN, Chronically ill, Mild Distress (due to coughing)


Eyes:  Bilateral Eye Normal Inspection, Bilateral Eye PERRL


HEENT:  PERRL/EOMI, Normal ENT Inspection, Pharynx Normal


Neck:  Full Range of Motion, Normal Inspection, Non Tender, Supple, Carotid 

Bruit


Respiratory:  Chest Non Tender, No Accessory Muscle Use, No Respiratory Distress

, Crackles, Decreased Breath Sounds, Wheezing


Cardiovascular:  Regular Rate, Rhythm, No Edema, No Gallop, No JVD, No Murmur, 

Normal Peripheral Pulses


Gastrointestinal:  Normal Bowel Sounds, No Organomegaly, No Pulsatile Mass, Non 

Tender, Soft


Back:  Normal Inspection, No CVA Tenderness, No Vertebral Tenderness


Extremity:  Normal Capillary Refill, Normal Inspection, Normal Range of Motion, 

Non Tender, No Calf Tenderness, No Pedal Edema


Neurologic/Psychiatric:  Alert, Oriented x3, No Motor/Sensory Deficits, Normal 

Mood/Affect


Skin:  Normal Color, Warm/Dry


Lymphatic:  No Adenopathy





Results


Results/Procedures


Lab


Laboratory Tests


2/2/18 07:31














Assessment/Plan


Admission Diagnosis


Assessment:


Acute influenza B infection with inability to walk





Assessment and Plan


Plan:


IVF


Supportive care


IVF


Tamiflu


Home meds


Nebs


O2


Anti-tussives





Diagnosis/Problems


Diagnosis/Problems





(1) Influenza B


Status:  Acute


Assessment & Plan:  Tamiflu and supportive care





(2) Wheezing


Status:  Acute


Assessment & Plan:  Nebs, O2





(3) Hyponatremia


Status:  Acute


Assessment & Plan:  IVF and check labs in am





(4) Vertigo


Status:  Acute


Assessment & Plan:  Supportive care


Fall risk














TRACEY JOHNSON DO Feb 2, 2018 09:22

## 2018-02-03 VITALS — SYSTOLIC BLOOD PRESSURE: 140 MMHG | DIASTOLIC BLOOD PRESSURE: 65 MMHG

## 2018-02-03 VITALS — DIASTOLIC BLOOD PRESSURE: 80 MMHG | SYSTOLIC BLOOD PRESSURE: 140 MMHG

## 2018-02-03 VITALS — SYSTOLIC BLOOD PRESSURE: 167 MMHG | DIASTOLIC BLOOD PRESSURE: 76 MMHG

## 2018-02-03 VITALS — DIASTOLIC BLOOD PRESSURE: 60 MMHG | SYSTOLIC BLOOD PRESSURE: 130 MMHG

## 2018-02-03 VITALS — DIASTOLIC BLOOD PRESSURE: 71 MMHG | SYSTOLIC BLOOD PRESSURE: 123 MMHG

## 2018-02-03 VITALS — DIASTOLIC BLOOD PRESSURE: 60 MMHG | SYSTOLIC BLOOD PRESSURE: 137 MMHG

## 2018-02-03 VITALS — DIASTOLIC BLOOD PRESSURE: 78 MMHG | SYSTOLIC BLOOD PRESSURE: 119 MMHG

## 2018-02-03 LAB
ALBUMIN SERPL-MCNC: 3.5 GM/DL (ref 3.2–4.5)
ALP SERPL-CCNC: 45 U/L (ref 40–136)
ALT SERPL-CCNC: 29 U/L (ref 0–55)
BASOPHILS # BLD AUTO: 0 10^3/UL (ref 0–0.1)
BASOPHILS NFR BLD AUTO: 0 % (ref 0–10)
BILIRUB SERPL-MCNC: 0.4 MG/DL (ref 0.1–1)
BUN/CREAT SERPL: 13
CALCIUM SERPL-MCNC: 8.6 MG/DL (ref 8.5–10.1)
CHLORIDE SERPL-SCNC: 103 MMOL/L (ref 98–107)
CO2 SERPL-SCNC: 20 MMOL/L (ref 21–32)
CREAT SERPL-MCNC: 0.82 MG/DL (ref 0.6–1.3)
EOSINOPHIL # BLD AUTO: 0 10^3/UL (ref 0–0.3)
EOSINOPHIL NFR BLD AUTO: 0 % (ref 0–10)
ERYTHROCYTE [DISTWIDTH] IN BLOOD BY AUTOMATED COUNT: 16.4 % (ref 10–14.5)
GFR SERPLBLD BASED ON 1.73 SQ M-ARVRAT: > 60 ML/MIN
GLUCOSE SERPL-MCNC: 110 MG/DL (ref 70–105)
HCT VFR BLD CALC: 37 % (ref 35–52)
HGB BLD-MCNC: 12.6 G/DL (ref 11.5–16)
LYMPHOCYTES # BLD AUTO: 0.8 X 10^3 (ref 1–4)
LYMPHOCYTES NFR BLD AUTO: 11 % (ref 12–44)
MANUAL DIFFERENTIAL PERFORMED BLD QL: NO
MCH RBC QN AUTO: 29 PG (ref 25–34)
MCHC RBC AUTO-ENTMCNC: 34 G/DL (ref 32–36)
MCV RBC AUTO: 86 FL (ref 80–99)
MONOCYTES # BLD AUTO: 1.3 X 10^3 (ref 0–1)
MONOCYTES NFR BLD AUTO: 18 % (ref 0–12)
NEUTROPHILS # BLD AUTO: 5.3 X 10^3 (ref 1.8–7.8)
NEUTROPHILS NFR BLD AUTO: 71 % (ref 42–75)
PLATELET # BLD: 220 10^3/UL (ref 130–400)
PMV BLD AUTO: 11.5 FL (ref 7.4–10.4)
POTASSIUM SERPL-SCNC: 4.2 MMOL/L (ref 3.6–5)
PROT SERPL-MCNC: 6.1 GM/DL (ref 6.4–8.2)
RBC # BLD AUTO: 4.34 10^6/UL (ref 4.35–5.85)
SODIUM SERPL-SCNC: 134 MMOL/L (ref 135–145)
WBC # BLD AUTO: 7.4 10^3/UL (ref 4.3–11)

## 2018-02-03 RX ADMIN — AMLODIPINE BESYLATE SCH MG: 5 TABLET ORAL at 08:58

## 2018-02-03 RX ADMIN — ALBUTEROL SULFATE SCH MG: 2.5 SOLUTION RESPIRATORY (INHALATION) at 16:44

## 2018-02-03 RX ADMIN — OSELTAMIVIR PHOSPHATE SCH EACH: 30 CAPSULE ORAL at 20:44

## 2018-02-03 RX ADMIN — GUAIFENESIN AND CODEINE PHOSPHATE PRN ML: 100; 10 SOLUTION ORAL at 08:59

## 2018-02-03 RX ADMIN — GUAIFENESIN AND CODEINE PHOSPHATE PRN ML: 100; 10 SOLUTION ORAL at 13:58

## 2018-02-03 RX ADMIN — PANTOPRAZOLE SODIUM SCH MG: 40 TABLET, DELAYED RELEASE ORAL at 05:48

## 2018-02-03 RX ADMIN — GUAIFENESIN AND CODEINE PHOSPHATE PRN ML: 100; 10 SOLUTION ORAL at 04:29

## 2018-02-03 RX ADMIN — OSELTAMIVIR PHOSPHATE SCH EACH: 30 CAPSULE ORAL at 08:58

## 2018-02-03 RX ADMIN — CARVEDILOL SCH EA: 25 TABLET, FILM COATED ORAL at 12:33

## 2018-02-03 RX ADMIN — CARVEDILOL SCH EA: 25 TABLET, FILM COATED ORAL at 20:43

## 2018-02-03 RX ADMIN — ALBUTEROL SULFATE SCH MG: 2.5 SOLUTION RESPIRATORY (INHALATION) at 19:57

## 2018-02-03 RX ADMIN — SODIUM CHLORIDE AND POTASSIUM CHLORIDE SCH MLS/HR: 9; 1.49 INJECTION, SOLUTION INTRAVENOUS at 11:07

## 2018-02-03 RX ADMIN — GUAIFENESIN AND CODEINE PHOSPHATE PRN ML: 100; 10 SOLUTION ORAL at 00:10

## 2018-02-03 RX ADMIN — GUAIFENESIN AND CODEINE PHOSPHATE PRN ML: 100; 10 SOLUTION ORAL at 20:43

## 2018-02-03 RX ADMIN — ALBUTEROL SULFATE SCH MG: 2.5 SOLUTION RESPIRATORY (INHALATION) at 07:00

## 2018-02-03 RX ADMIN — SODIUM CHLORIDE AND POTASSIUM CHLORIDE SCH MLS/HR: 9; 1.49 INJECTION, SOLUTION INTRAVENOUS at 04:29

## 2018-02-03 RX ADMIN — ALBUTEROL SULFATE SCH MG: 2.5 SOLUTION RESPIRATORY (INHALATION) at 11:48

## 2018-02-03 NOTE — PROGRESS NOTE-HOSPITALIST
Progress Note


HPI/CC on Admission


CC: Influenza B with inability to ambulate





HPI: This is an 89yo[facility] clinic patient of Dr Gamez's who presented to the ER w/c

/o fever and cough and so weak she could no longer walk. The illness began 

yesterday morning and had worsened to the point that her daughter brought her 

to the ER.  She was found to have influenza B positive the chest x-ray was 

normal but did have wheezing and hyponatremia so she was in need of Tamiflu 

administration along with IV fluid supportive care breathing treatments and 

monitored closely.





Progress Notes/Assess & Plan


Date Seen


2/3/18


Time Seen by Provider:  10:45


Admission Dx/Process


Assessment:


Acute influenza B infection with inability to walk


Diagonsis/Assessment & Plan


Patient doing much better but has a sore throat and still very weak


Has been ambulating fairly well


Discharge planning for tomorrow per daughter


IV fluids will be hep-locked since she is eating and drinking well


Wants a little bit of something for her bowels and she is slightly constipated 

last was Thursday


Check labs and meds





No fever, vital signs stable, pleasant, improved, hard of hearing, cognitive 

decline noted daughter at the bedside


Regular rate and rhythm, clear to auscultation bilaterally but very subtle 

crackles in the left lower lobe


No edema





Assessment:


Acute influenza B infection with inability to walk





Plan:


HLIVF


Supportive care


IVF


Tamiflu


Home meds


Nebs


O2


Anti-tussives


Chloraseptic spray


BM regimen





Diagnosis/Problems


Diagnosis/Problems





(1) Influenza B


Status:  Acute


Assessment & Plan:  Tamiflu and supportive care





(2) Wheezing


Status:  Acute


Assessment & Plan:  Nebs, O2





(3) Hyponatremia


Status:  Resolved


Assessment & Plan:  IVF and check labs in am


HLIVF today





(4) Vertigo


Status:  Resolved


Assessment & Plan:  Supportive care


Fall risk





(5) Acute constipation


Status:  Acute


Assessment & Plan:  Colace, Miralax





(6) Sore throat


Status:  Acute


Assessment & Plan:  Chloraseptic spray














BARBRA OCONNOR DO Feb 3, 2018 12:08

## 2018-02-03 NOTE — DIAGNOSTIC IMAGING REPORT
EXAMINATION: PA and lateral chest at 10:14 a.m.



INDICATION: Wheezing, cough.



FINDINGS: The heart size is within normal limits and stable when

compared to 02/02/2018. The lungs remain clear. There is still no

evidence for failure or pneumonia. The lateral view does show

that there is slight blunting of both costophrenic angles

posteriorly. This may be secondary to pleural thickening. Small

effusions could also present in this manner.



The mediastinum is not widened. The osseous structures are

intact.



IMPRESSION:

1. There is blunting of the costophrenic angles posteriorly.

Whether this is related to the presence of a small amount of

pleural fluid bilaterally or to pleural thickening is not

certain.

2. There is no acute cardiopulmonary abnormality noted otherwise.



Dictated by: 



  Dictated on workstation # [id number]

## 2018-02-04 VITALS — SYSTOLIC BLOOD PRESSURE: 150 MMHG | DIASTOLIC BLOOD PRESSURE: 67 MMHG

## 2018-02-04 VITALS — DIASTOLIC BLOOD PRESSURE: 50 MMHG | SYSTOLIC BLOOD PRESSURE: 105 MMHG

## 2018-02-04 RX ADMIN — PANTOPRAZOLE SODIUM SCH MG: 40 TABLET, DELAYED RELEASE ORAL at 06:12

## 2018-02-04 RX ADMIN — CARVEDILOL SCH EA: 25 TABLET, FILM COATED ORAL at 08:22

## 2018-02-04 RX ADMIN — GUAIFENESIN AND CODEINE PHOSPHATE PRN ML: 100; 10 SOLUTION ORAL at 00:50

## 2018-02-04 RX ADMIN — OSELTAMIVIR PHOSPHATE SCH EACH: 30 CAPSULE ORAL at 08:22

## 2018-02-04 RX ADMIN — ALBUTEROL SULFATE SCH MG: 2.5 SOLUTION RESPIRATORY (INHALATION) at 12:13

## 2018-02-04 RX ADMIN — ALBUTEROL SULFATE SCH MG: 2.5 SOLUTION RESPIRATORY (INHALATION) at 07:43

## 2018-02-04 RX ADMIN — AMLODIPINE BESYLATE SCH MG: 5 TABLET ORAL at 08:22

## 2018-02-04 RX ADMIN — GUAIFENESIN AND CODEINE PHOSPHATE PRN ML: 100; 10 SOLUTION ORAL at 11:28

## 2018-02-04 NOTE — DISCHARGE SUMMARY-HOSPITALIST
Diagnosis/Chief Complaint


Date of Admission


Feb 2, 2018 at 09:01


Date of Discharge





Discharge Date:  Feb 4, 2018


Admission Diagnosis


Assessment:


Acute influenza B infection with inability to walk





Discharge Diagnosis


Patient doing much better but has a sore throat and still very weak


Has been ambulating fairly well


Discharge planning for tomorrow per daughter


IV fluids will be hep-locked since she is eating and drinking well


Wants a little bit of something for her bowels and she is slightly constipated 

last was Thursday


Check labs and meds





No fever, vital signs stable, pleasant, improved, hard of hearing, cognitive 

decline noted daughter at the bedside


Regular rate and rhythm, clear to auscultation bilaterally but very subtle 

crackles in the left lower lobe


No edema





Assessment:


Acute influenza B infection with inability to walk





Plan:


HLIVF


Supportive care


IVF


Tamiflu


Home meds


Nebs


O2


Anti-tussives


Chloraseptic spray


BM regimen





(1) Influenza B


Status:  Acute


Assessment & Plan:  Tamiflu and supportive care





(2) Wheezing


Status:  Acute


Assessment & Plan:  Nebs, O2





(3) Hyponatremia


Status:  Resolved


Assessment & Plan:  IVF and check labs in am


HLIVF today





(4) Vertigo


Status:  Resolved


Assessment & Plan:  Supportive care


Fall risk





(5) Acute constipation


Status:  Resolved


Assessment & Plan:  Colace, Miralax





(6) Sore throat


Status:  Acute


Assessment & Plan:  Chloraseptic spray








Discharge Summary


Discharge Physical Examination


Allergies:  


Coded Allergies:  


     No Known Drug Allergies (Unverified , 2/2/18)


Vitals & I&Os





Vital Signs








  Date Time  Temp Pulse Resp B/P (MAP) Pulse Ox O2 Delivery O2 Flow Rate FiO2


 


2/4/18 08:36      Room Air  


 


2/4/18 08:32 97.8 86 18 150/67 (94) 93   


 


2/2/18 16:26        21











Hospital Course


Hospital course: Patient had an uneventful hospital course she was admitted 

placed in observation status due to inability to walk due to influenza 

infection.  Breathing treatments and coughing medication were both given with 

good results in constipation issue resolved by time of discharge.  Walker was 

provided by DME and she will go home with her daughter that her frail status 

and advanced age preclude anything but a poor prognosis and patient will be at 

high risk for pneumonia following influenza but will have close follow-up with 

primary care provider this week.


Labs (last 24 hrs)





Microbiology


2/2/18 Blood Culture - Preliminary, Resulted


         No growth


2/2/18 Influenza Types A,B Antigen ([facility]) - Final, Complete


         








Discharge


Home Medications:





Active Scripts


Active


Tamiflu (Oseltamivir Phosphate) 30 Mg Capsule 0 Each PO BID 2 Days


Reported


Lisinopril 40 Mg Tablet 40 Mg PO DAILY


Multi-Vitamin Daily (Multivitamin) 1 Each Tablet 1 Tab PO DAILY


Vitamin D-3 (Cholecalciferol (Vitamin D3)) 2,000 Unit Capsule 2,000 Unit PO 

DAILY


Omeprazole 20 Mg Capsule.dr 20 Mg PO DAILY


Amlodipine Besylate 5 Mg Tablet 5 Mg PO DAILY





Instructions to patient/family


Please see electronic discharge instructions given to patient.





Clinical Quality Measures


DVT/VTE Risk/Contraindication:


Risk Factor Score Per Nursing:  3


RFS Level Per Nursing on Admit:  3=High





Copy


Copies To 1:   TRICIA HWANG MD, MINDI DO Feb 4, 2018 12:21

## 2019-08-27 ENCOUNTER — APPOINTMENT (RX ONLY)
Dept: URBAN - METROPOLITAN AREA CLINIC 51 | Facility: CLINIC | Age: 84
Setting detail: DERMATOLOGY
End: 2019-08-27

## 2019-08-27 DIAGNOSIS — L57.0 ACTINIC KERATOSIS: ICD-10-CM

## 2019-08-27 DIAGNOSIS — L82.1 OTHER SEBORRHEIC KERATOSIS: ICD-10-CM

## 2019-08-27 PROBLEM — K21.9 GASTRO-ESOPHAGEAL REFLUX DISEASE WITHOUT ESOPHAGITIS: Status: ACTIVE | Noted: 2019-08-27

## 2019-08-27 PROBLEM — H91.90 UNSPECIFIED HEARING LOSS, UNSPECIFIED EAR: Status: ACTIVE | Noted: 2019-08-27

## 2019-08-27 PROBLEM — I10 ESSENTIAL (PRIMARY) HYPERTENSION: Status: ACTIVE | Noted: 2019-08-27

## 2019-08-27 PROBLEM — D04.39 CARCINOMA IN SITU OF SKIN OF OTHER PARTS OF FACE: Status: ACTIVE | Noted: 2019-08-27

## 2019-08-27 PROCEDURE — 99202 OFFICE O/P NEW SF 15 MIN: CPT | Mod: 25

## 2019-08-27 PROCEDURE — ? PRESCRIPTION

## 2019-08-27 PROCEDURE — 17282 DSTR MAL LS F/E/E/N/L/M1.1-2: CPT

## 2019-08-27 PROCEDURE — ? SHAVE REMOVAL AND DESTRUCTION

## 2019-08-27 PROCEDURE — ? COUNSELING

## 2019-08-27 RX ORDER — FLUOROURACIL 50 MG/G
CREAM TOPICAL BID
Qty: 1 | Refills: 1 | Status: ERX | COMMUNITY
Start: 2019-08-27

## 2019-08-27 RX ADMIN — FLUOROURACIL: 50 CREAM TOPICAL at 19:27

## 2019-08-27 ASSESSMENT — LOCATION ZONE DERM
LOCATION ZONE: TRUNK
LOCATION ZONE: ARM

## 2019-08-27 ASSESSMENT — LOCATION SIMPLE DESCRIPTION DERM
LOCATION SIMPLE: RIGHT FOREARM
LOCATION SIMPLE: LEFT UPPER BACK
LOCATION SIMPLE: LEFT FOREARM
LOCATION SIMPLE: RIGHT UPPER BACK

## 2019-08-27 ASSESSMENT — PAIN INTENSITY VAS
HOW INTENSE IS YOUR PAIN 0 BEING NO PAIN, 10 BEING THE MOST SEVERE PAIN POSSIBLE?: 1/10 PAIN
HOW INTENSE IS YOUR PAIN 0 BEING NO PAIN, 10 BEING THE MOST SEVERE PAIN POSSIBLE?: NO PAIN

## 2019-08-27 ASSESSMENT — LOCATION DETAILED DESCRIPTION DERM
LOCATION DETAILED: LEFT INFERIOR MEDIAL UPPER BACK
LOCATION DETAILED: RIGHT DISTAL DORSAL FOREARM
LOCATION DETAILED: RIGHT LATERAL UPPER BACK
LOCATION DETAILED: LEFT SUPERIOR UPPER BACK
LOCATION DETAILED: LEFT DISTAL DORSAL FOREARM

## 2019-08-27 NOTE — PROCEDURE: SHAVE REMOVAL AND DESTRUCTION
Wound Care: Aquaphor
Bill As?: Note: Bill Malignant Destruction If Path Confirms Malignant Lesion. Only Bill As Shave Removal If Path Comes Back Benign. Do Not Bill Shave Removal On Malignant Lesions.: Malignant Destruction
Notification Instructions: Patient will be notified of biopsy results. However, patient instructed to call the office if not contacted within 2 weeks.
Anesthesia Volume In Cc: 0
Cautery Type: electrodesiccation
Size Of Lesion In Cm: 1.8
Bill For Surgical Tray: no
Render Post-Care Instructions In Note?: yes
Size After Destruction (Required For Destruction Billing): 1.9
Anesthesia Volume In Cc: 1
Dressing: Band-Aid
Post-Care Instructions: I reviewed with the patient in detail post-care instructions. Patient is to keep the biopsy site dry overnight, and then apply vaseline daily until healed. Patient may apply hydrogen peroxide soaks to remove any crusting.
Lab: [id number]
Lab Facility: [id number]
Billing Type: Third-Party Bill
Detail Level: Detailed
Hemostasis: Electrocautery
Number Of Curettages: 3
Anesthesia Type: 1% lidocaine with epinephrine
Path Notes (To The Dermatopathologist): 1.8 cm
Consent: Verbal consent was obtained and risks were reviewed including but not limited to scarring, infection, bleeding, scabbing, incomplete removal, nerve damage and allergy to anesthesia.